# Patient Record
Sex: MALE | URBAN - METROPOLITAN AREA
[De-identification: names, ages, dates, MRNs, and addresses within clinical notes are randomized per-mention and may not be internally consistent; named-entity substitution may affect disease eponyms.]

---

## 2018-05-27 ENCOUNTER — OFFICE VISIT (OUTPATIENT)
Dept: URGENT CARE | Age: 73
End: 2018-05-27

## 2018-05-27 VITALS
SYSTOLIC BLOOD PRESSURE: 136 MMHG | WEIGHT: 152 LBS | BODY MASS INDEX: 24.43 KG/M2 | HEART RATE: 65 BPM | OXYGEN SATURATION: 96 % | RESPIRATION RATE: 16 BRPM | DIASTOLIC BLOOD PRESSURE: 81 MMHG | TEMPERATURE: 97.5 F | HEIGHT: 66 IN

## 2018-05-27 DIAGNOSIS — H10.30 ACUTE BACTERIAL CONJUNCTIVITIS, UNSPECIFIED LATERALITY: Primary | ICD-10-CM

## 2018-05-27 RX ORDER — ROSUVASTATIN CALCIUM 20 MG/1
20 TABLET, COATED ORAL
COMMUNITY

## 2018-05-27 RX ORDER — SULFACETAMIDE SODIUM 100 MG/ML
2 SOLUTION/ DROPS OPHTHALMIC 4 TIMES DAILY
Qty: 5 ML | Refills: 0 | Status: SHIPPED | OUTPATIENT
Start: 2018-05-27 | End: 2018-06-06

## 2018-05-27 RX ORDER — DABIGATRAN ETEXILATE 150 MG/1
CAPSULE ORAL EVERY 12 HOURS
COMMUNITY

## 2018-05-27 NOTE — PATIENT INSTRUCTIONS
? au m?t ??: H???ng D?n Ch?m Sóc - [ [facility]: Care Instructions ]  H??ng D?n Ch?m Sóc    ?au m?t ?? là tình tr?ng ? ? và s?ng phù b? m?t m?t và k?t m?c (niêm m?c mí m?t và màng chino ph? tròng tr?ng m?t). ? au m?t ?? c?ng ? ??c g?i là viêm k?t m?c. ?au m?t ?? th??ng gây ra b?i vi khu?n hay vi-rút. Addison Ambrocio khô, các d? ?ng, khói và hóa ch?t là nh?ng nguyên nhân ph? bi?n khác. ?au m?t ?? th??ng t? kh?i jennifer 7 ? ?n 10 ngày. Kháng sinh ch? có tác d?ng n?u ?au m?t ?? b? gây ra do vi khu?n. ? au m?t ?? do juan?m trùng th??ng d? lây ronen. N?u d? ?ng ho?c hóa ch?t gây ?au m?t ??, b?nh s? không kh?i tr? khi quý v? có th? tránh ? ??c b?t c? NLXQHN nhân nào gây ra nó. Ch?m sóc aracelis dõi là m?t ph?n roberto carlos tr?ng cho vi?c ?i?u tr? và s? an toàn c?a quý v?. ??m b?o s?p x?p và ??n t?t c? các bu?i h?n và g?i ?i?n cho bác s? n?u quý v? có v?n ?? Maribel Spruce v? c?ng nên bi?t k?t qu? xét khris?m c?a mình và gi? l?i rebekah sách các lo?i thu?c mà quý v? dùng. Quý v? có th? ch?m sóc b?n thân t?i nhà th? nào? · Th??ng xuyên r?a myles. Luôn r?a myles tr? ?c và gregorio khi quý v? ?i?u tr? ?au m?t ?? ho?c ch?m vào m?t hay m?t.  · Dùng bông ??t ho?c kh?n ??t, s?ch ?? lo?i b? ghèn m?t. Viera t? góc jennifer m?t ra ngoài. Dùng ph?n v?i s?ch cho t?ng l?n viera. · ??t kh?n ??t l?nh ho?c ?m lên m?t vài l?n m?t ngày n?u m?t b? ?au. · Không ?eo kính áp tròng ho?c ratna ?i?m m?t cho ??n khi kh?i ?au m?t ??. V?t b? các d?ng c? ratna ?i?m m?t mà quý v? ?ang s? d?ng khi b? ?au m?t ? ? . Làm s?ch kính áp tròng và h?p ??ng. N?u quý v? ?eo kính áp tròng lo?i dùng m?t l?n, hãy s? d?ng c?p kính m?i khi kh?i ?au m?t và an toàn khi ?eo kính áp tròng l?i.  · N?u bác s? kê cho quý v? thu?c m? ho?c thu?c nh? m?t kháng sinh, hãy s? d?ng aracelis ch? d?n. Hãy s? d?ng thu?c jennifer th?i ivelisse ch? d?n, ngay c? n?u m?t c?a quý v? b?t ??u ?? h?n. Gi? s?ch ??u l? thu?c tránh ti?p xúc v?i vùng m?t.  · ?? nh? thu?c hay tra thu?c:  ¨ Jacqueline ? ? u v? gregorio, zonia m?t tawny li d??i finn?eduardo.   Eleanor Nh? hay tra thu?c vào bên jennifer mí m?t d??i.  ¨ Nh?m m?t jennifer 30 t?i 60 giây ? ? cho thu?c ronen ra kh?p m?t.  ¨ Không ch?m ??u nh? thu?c m? hay thu?c nh? vào lông mi hay b?t k? b? m?t nào khác. · Không dùng montemayor kh?n t?m, g?i ho?c kh?n m?t khi quý v? b? ?au m?t ?? Thomas Burrs nào quý v? nên g?i tr? giúp? G?i bác s? c?a quý v? ngay ho?c tìm n?i ch?m sóc y t? ngay n?u:  · Quý v? b? ?au jennifer m?t, ch? không ch? kích ?ng trên b? m?t.  · Quý v? b? thay ??i th? l?c ho?c m?t th? l?c.  · M?t quý v? ch?y juan?u ghèn. · M?t quý v? không b?t ??u c?i thi?n ho?c b?t ? ?u tr? nên t? h?n jennifer vòng 48 gi? gregorio khi quý v? b?t ??u s? d?ng thu?c kháng sinh. · B?nh ?au m?t ?? kéo dài h?n 7 ngày. Noe dõi sát vera nh?ng thay ??i v? s?c kh?e c?a quý v? và ??m b?o liên h? v?i bác s? n?u quý v? có b?t k? v?n ?? gì. Quý v? có th? tìm hi?u thêm ? ?âu? Vào [email or web address] Enter [email or web address] tìm hi?u thêm jennifer hô?p ti?m kiê?m \"?au m?t ??: H???ng D?n Ch?m Sóc - [ [facility]: Care Instructions ]. \"  [employer] hi?u l?c k? t?: [address],   Phiên b?n n?i ashish.4  © 7747-3206 [employer]. H??ng d?n ch?m sóc ? ??c s?a ??i cho phù h?p noe gi?y phép c?a chuyên yesy ch?m sóc y t?. N?u quý v? có th??c m??c v? các ?i?u ki?n y t? ho?c h??ng d?n này, kathy smithi lòng h?i chuyên yesy ch?m so?c y t?. Tâ?p ?oa?n Healthwise kh??c t? m?i ??m b?o ho?c trách juan?m v? vi?c s? d?ng thông tin na?y.

## 2018-05-27 NOTE — MR AVS SNAPSHOT
[address] [address] 
[phone] Patient: Spring Mathews MRN: [id number] Visit Information Date & Time Provider Department Dept. Phone Encounter #  
 5/27/2018 12:30 PM [facility] [phone] [id number] Upcoming Health Maintenance Date Due DTaP/Tdap/Td series (1 - Tdap) 4/7/1965 FOBT Q 1 YEAR AGE 50-75 4/7/1994 ZOSTER VACCINE AGE 60> 2/7/2004 GLAUCOMA SCREENING Q2Y 4/7/2009 Pneumococcal 65+ Low/Medium Risk (1 of 2 - PCV13) 4/7/2009 Influenza Age 5 to Adult 8/1/2018 Allergies as of 5/27/2018  Review Complete On: 5/27/2018 By: James Melchor RN No Known Allergies Current Immunizations  Never Reviewed No immunizations on file. Not reviewed this visit You Were Diagnosed With   
  
 Codes Comments Acute bacterial conjunctivitis, unspecified laterality    -  Primary ICD-10-CM: H10.30 ICD-9-CM: 372.03 Vitals BP Pulse Temp Resp Height(growth percentile) Weight(growth percentile) 136/81 65 97.5 °F (36.4 °C) 16 5' 6\" (1.676 m) 152 lb (68.9 kg) SpO2 BMI Smoking Status 96% 24.53 kg/m2 Never Smoker BMI and BSA Data Body Mass Index Body Surface Area 24.53 kg/m 2 1.79 m 2 Your Updated Medication List  
  
   
This list is accurate as of 5/27/18  1:04 PM.  Always use your most recent med list.  
  
  
  
  
 CRESTOR 20 mg tablet Generic drug:  rosuvastatin Take 20 mg by mouth nightly. OTHER Med for bp and a fib PRADAXA 150 mg capsule Generic drug:  dabigatran etexilate Take  by mouth every twelve (12) hours. sulfacetamide 10 % ophthalmic solution Commonly known as:  BLEPH-10 Administer 2 Drops to left eye four (4) times daily for 10 days. Prescriptions Printed  Refills  
 sulfacetamide (BLEPH-10) 10 % ophthalmic solution 0  
 Sig: Administer 2 Drops to left eye four (4) times daily for 10 days. Class: Print Route: Left Eye Patient Instructions ? au m?t ??: H???ng D?n Ch?m Sóc - [ [facility]: Care Instructions ] H??ng D?n Ch?m Sóc 
 
?au m?t ?? là tình tr?ng ? ? và s?ng phù b? m?t m?t và k?t m?c (niêm m?c mí m?t và màng chino ph? tròng tr?ng m?t). ? au m?t ?? c?ng ? ??c g?i là viêm k?t m?c. ?au m?t ?? th??ng gây ra b?i vi khu?n hay vi-rút. Dmitri An khô, các d? ?ng, khói và hóa ch?t là nh?ng nguyên nhân ph? bi?n khác. ?au m?t ?? th??ng t? kh?i jennifer 7 ? ?n 10 ngày. Kháng sinh ch? có tác d?ng n?u ?au m?t ?? b? gây ra do vi khu?n. ? au m?t ?? do juan?m trùng th??ng d? lây ronen. N?u d? ?ng ho?c hóa ch?t gây ?au m?t ??, b?nh s? không kh?i tr? khi quý v? có th? tránh ? ??c b?t c? HNQEKL nhân nào gây ra nó. Ch?m sóc aracelis dõi là m?t ph?n roberto carlos tr?ng cho vi?c ?i?u tr? và s? an toàn c?a quý v?. ??m b?o s?p x?p và ??n t?t c? các bu?i h?n và g?i ?i?n cho bác s? n?u quý v? có v?n ?? Charlotta Balloon v? c?ng nên bi?t k?t qu? xét khris?m c?a mình và gi? l?i rebekah sách các lo?i thu?c mà quý v? dùng. Quý v? có th? ch?m sóc b?n thân t?i nhà th? nào? · Th??ng xuyên r?a myles. Luôn r?a myles tr? ?c và gregorio khi quý v? ?i?u tr? ?au m?t ?? ho?c ch?m vào m?t hay m?t. 
· Dùng bông ??t ho?c kh?n ??t, s?ch ?? lo?i b? ghèn m?t. Viera t? góc jennifer m?t ra ngoài. Dùng ph?n v?i s?ch cho t?ng l?n viera. · ??t kh?n ??t l?nh ho?c ?m lên m?t vài l?n m?t ngày n?u m?t b? ?au. · Không ?eo kính áp tròng ho?c ratna ?i?m m?t cho ??n khi kh?i ?au m?t ??. V?t b? các d?ng c? ratna ?i?m m?t mà quý v? ?ang s? d?ng khi b? ?au m?t ? ? . Làm s?ch kính áp tròng và h?p ??ng. N?u quý v? ?eo kính áp tròng lo?i dùng m?t l?n, hãy s? d?ng c?p kính m?i khi kh?i ?au m?t và an toàn khi ?eo kính áp tròng l?i. 
· N?u bác s? kê cho quý v? thu?c m? ho?c thu?c nh? m?t kháng sinh, hãy s? d?ng aracelis ch? d?n. Hãy s? d?ng thu?c jennifer th?i ivelisse ch? d?n, ngay c? n?u m?t c?a quý v? b?t ??u ?? h?n. Gi? s?ch ??u l? thu?c tránh ti?p xúc v?i vùng m?t. 
· ?? nh? thu?c hay tra thu?c: 
¨ Nghiêng ? ? u v? gregorio, dùng m?t ngón myles kéo mí d??i briseno?ng. ¨ Nh? hay tra thu?c vào bên jennifer mí m?t d??i. 
¨ Nh?m m?t jennifer 30 t?i 60 giây ? ? cho thu?c ronen ra kh?p m?t. 
¨ Không ch?m ??u nh? thu?c m? hay thu?c nh? vào lông mi hay b?t k? b? m?t nào khác. · Không dùng montemayor kh?n t?m, g?i ho?c kh?n m?t khi quý v? b? ?au m?t ?? Judithann Born nào quý v? nên g?i tr? giúp? G?i bác s? c?a quý v? ngay ho?c tìm n?i ch?m sóc y t? ngay n?u: 
· Quý v? b? ?au jennifer m?t, ch? không ch? kích ?ng trên b? m?t. 
· Quý v? b? thay ??i th? l?c ho?c m?t th? l?c. 
· M?t quý v? ch?y juan?u ghèn. · M?t quý v? không b?t ??u c?i thi?n ho?c b?t ? ?u tr? nên t? h?n jennifer vòng 48 gi? gregorio khi quý v? b?t ??u s? d?ng thu?c kháng sinh. · B?nh ?au m?t ?? kéo dài h?n 7 ngày. Noe dõi sát vera nh?ng thay ??i v? s?c kh?e c?a quý v? và ??m b?o liên h? v?i bác s? n?u quý v? có b?t k? v?n ?? gì. Quý v? có th? tìm hi?u thêm ? ?âu? Vào [email or web address] Enter [email or web address] tìm hi?u thêm jennifer hô?p ti?m kiê?m \"?au m?t ??: H???ng D?n Ch?m Sóc - [ [place]: Care Instructions ]. \" 
Maida Nakai hi?u l?c k? t?: [address],  Phiên b?n n?i ashish.4 © 6739-0044 [employer]. H??ng d?n ch?m sóc ? ??c s?a ??i cho phù h?p noe gi?y phép c?a chuyên yesy ch?m sóc y t?. N?u quý v? có th??c m??c v? các ?i?u ki?n y t? ho?c h??ng d?n này, kathy vui lòng h?i chuyên yesy ch?m so?c y t?. Tâ?p ?oa?n Healthwise kh??c t? m?i ??m b?o ho?c trách juan?m v? vi?c s? d?ng thông tin na?y. Introducing [facility]! Kendrick Meier introduces [employer] patient portal. Now you can access parts of your medical record, email your doctor's office, and request medication refills online. 1. In your internet browser, go to [email or web address] 2. Click on the First Time User? Click Here link in the Sign In box.  You will see the New Member Sign Up page. 3. Enter your [employer] Access Code exactly as it appears below. You will not need to use this code after youve completed the sign-up process. If you do not sign up before the expiration date, you must request a new code. · [employer] Access Code: [email or web address] Expires: 8/25/2018  1:04 PM 
 
4. Enter the last four digits of your Social Security Number (xxxx) and Date of Birth (mm/dd/yyyy) as indicated and click Submit. You will be taken to the next sign-up page. 5. Create a Selpheet ID. This will be your [employer] login ID and cannot be changed, so think of one that is secure and easy to remember. 6. Create a [employer] password. You can change your password at any time. 7. Enter your Password Reset Question and Answer. This can be used at a later time if you forget your password. 8. Enter your e-mail address. You will receive e-mail notification when new information is available in [address] 9. Click Sign Up. You can now view and download portions of your medical record. 10. Click the Download Summary menu link to download a portable copy of your medical information. If you have questions, please visit the Frequently Asked Questions section of the [employer] website. Remember, [employer] is NOT to be used for urgent needs. For medical emergencies, dial 911. Now available from your iPhone and Android! Please provide this summary of care documentation to your next provider. If you have any questions after today's visit, please call [phone].

## 2018-05-27 NOTE — PROGRESS NOTES
Patient is a 75 y.o. male presenting with eye problem. The history is provided by the patient. Eye Problem   This is a new problem. The current episode started 2 days ago. The problem occurs constantly. Pertinent negatives include no chest pain, no abdominal pain, no headaches and no shortness of breath. Nothing aggravates the symptoms. He has tried nothing for the symptoms. Past Medical History:   Diagnosis Date    Atrial fibrillation ([address]     Hypertension         History reviewed. No pertinent surgical history. History reviewed. No pertinent family history. Social History     Social History    Marital status:      Spouse name: N/A    Number of children: N/A    Years of education: N/A     Occupational History    Not on file. Social History Main Topics    Smoking status: Never Smoker    Smokeless tobacco: Never Used    Alcohol use Not on file    Drug use: Not on file    Sexual activity: Not on file     Other Topics Concern    Not on file     Social History Narrative    No narrative on file                ALLERGIES: Review of patient's allergies indicates no known allergies. Review of Systems   Constitutional: Negative for activity change and fever. Eyes: Positive for discharge and redness. Respiratory: Negative for shortness of breath. Cardiovascular: Negative for chest pain. Gastrointestinal: Negative for abdominal pain. Neurological: Negative for headaches. Vitals:    05/27/18 1248   BP: 136/81   Pulse: 65   Resp: 16   Temp: 97.5 °F (36.4 °C)   SpO2: 96%   Weight: 152 lb (68.9 kg)   Height: 5' 6\" (1.676 m)       Physical Exam   Constitutional: He is oriented to person, place, and time. He appears well-developed and well-nourished. HENT:   Head: Normocephalic and atraumatic. Eyes: EOM are normal.   Left conjunctiva injected   Cardiovascular: Normal rate and regular rhythm.     Pulmonary/Chest: Effort normal and breath sounds normal.   Neurological: He is alert and oriented to person, place, and time. Skin: Skin is warm and dry. Psychiatric: He has a normal mood and affect. His behavior is normal. Judgment and thought content normal.   Nursing note and vitals reviewed. MDM    Procedures    ICD-10-CM ICD-9-CM    1. Acute bacterial conjunctivitis, unspecified laterality H10.30 372.03      Medications Ordered Today   Medications    sulfacetamide (BLEPH-10) 10 % ophthalmic solution     Sig: Administer 2 Drops to left eye four (4) times daily for 10 days. Dispense:  5 mL     Refill:  0     The patients condition was discussed with the patient and they understand. The patient is to follow up with primary care doctor ,If signs and symptoms become worse the pt is to go to the ER. The patient is to take medications as prescribed.